# Patient Record
Sex: FEMALE | Race: WHITE | Employment: OTHER | URBAN - METROPOLITAN AREA
[De-identification: names, ages, dates, MRNs, and addresses within clinical notes are randomized per-mention and may not be internally consistent; named-entity substitution may affect disease eponyms.]

---

## 2020-07-24 ENCOUNTER — DOCUMENTATION ONLY (OUTPATIENT)
Dept: CARE COORDINATION | Facility: CLINIC | Age: 65
End: 2020-07-24

## 2020-08-20 ENCOUNTER — OFFICE VISIT (OUTPATIENT)
Dept: INTERNAL MEDICINE | Facility: CLINIC | Age: 65
End: 2020-08-20
Payer: COMMERCIAL

## 2020-08-20 VITALS
HEART RATE: 64 BPM | DIASTOLIC BLOOD PRESSURE: 82 MMHG | OXYGEN SATURATION: 98 % | SYSTOLIC BLOOD PRESSURE: 135 MMHG | WEIGHT: 127 LBS

## 2020-08-20 DIAGNOSIS — K52.9 COLITIS: ICD-10-CM

## 2020-08-20 DIAGNOSIS — Z00.00 LABORATORY EXAM ORDERED AS PART OF ROUTINE GENERAL MEDICAL EXAMINATION: ICD-10-CM

## 2020-08-20 DIAGNOSIS — Z85.3 PERSONAL HISTORY OF MALIGNANT NEOPLASM OF BREAST: ICD-10-CM

## 2020-08-20 DIAGNOSIS — D75.1 POLYCYTHEMIA: ICD-10-CM

## 2020-08-20 DIAGNOSIS — E04.1 THYROID NODULE: ICD-10-CM

## 2020-08-20 DIAGNOSIS — Z13.220 SCREENING FOR HYPERLIPIDEMIA: ICD-10-CM

## 2020-08-20 DIAGNOSIS — Z78.0 MENOPAUSE: Primary | ICD-10-CM

## 2020-08-20 RX ORDER — NEBIVOLOL 10 MG/1
10 TABLET ORAL
COMMUNITY
Start: 2019-07-24

## 2020-08-20 RX ORDER — SODIUM CHLORIDE 5 %
1 OINTMENT (GRAM) OPHTHALMIC (EYE) DAILY
COMMUNITY

## 2020-08-20 RX ORDER — LORAZEPAM 0.5 MG/1
TABLET ORAL
COMMUNITY
Start: 2019-12-02

## 2020-08-20 RX ORDER — MESALAMINE 1.2 G/1
2.4 TABLET, DELAYED RELEASE ORAL
COMMUNITY
Start: 2020-01-14

## 2020-08-20 RX ORDER — ASPIRIN 81 MG/1
TABLET ORAL
COMMUNITY
Start: 2018-10-02

## 2020-08-20 ASSESSMENT — PAIN SCALES - GENERAL: PAINLEVEL: NO PAIN (0)

## 2020-08-20 NOTE — NURSING NOTE
Chief Complaint   Patient presents with     Establish Care     pt here to establish care, has a ew questions       Jose Maria Marin CMA, EMT at 7:51 AM on 8/20/2020.

## 2020-08-20 NOTE — PATIENT INSTRUCTIONS
[facility] Medication Refill Request Information:  * Please contact your pharmacy regarding ANY request for medication refills.  ** [facility] Fax = [phone]  * Please allow 3 business days for routine medication refills.  * Please allow 5 business days for controlled substance medication refills.     [facility] Test Result notification information:  *You will be notified with in 7-10 days of your appointment day regarding the results of your test.  If you are on MyChart you will be notified as soon as the provider has reviewed the results and signed off on them.    [facility]: [phone]     Radiology for dexa scan:  [phone] [facility] [place]  [phone] [facility]  [phone] [facility]

## 2020-10-05 ENCOUNTER — VIRTUAL VISIT (OUTPATIENT)
Dept: INTERNAL MEDICINE | Facility: CLINIC | Age: 65
End: 2020-10-05
Payer: MEDICARE

## 2020-10-05 DIAGNOSIS — H81.11 BENIGN PAROXYSMAL POSITIONAL VERTIGO, RIGHT: Primary | ICD-10-CM

## 2020-10-05 PROCEDURE — 99213 OFFICE O/P EST LOW 20 MIN: CPT | Mod: 95 | Performed by: INTERNAL MEDICINE

## 2020-10-05 NOTE — PROGRESS NOTES
Pre chartin:10 a.m.- 11:30 a.m.    Total time = 20 min  Date of pre charting: 10/5/20  Date of anticipated future office visit date: 10:05 a.m.  Person performing pre charting work:  Dr. Garnica  Non face-to-face clinical work included review/documentation of medical history, medications, vital sign trends,  routine health care maintenance, specialist notes, laboratory, procedures, imaging) as detailed below.  Effort essential to preparing for upcoming service directly affecting ongoing primary care management and coordination of care for this patient for future OV date 10/5/20.    Virtual audio visit    CC:dizziness    Other health care team members  Primary Care physician:  Dr. Daily  ENT:  Dr. Truong ([facility])  Hematology/oncology:  Dr. Koch ([place])  GI:  [facility] Sch[facility]. Dr. Arreguin ([place])      HPI:  I have not met this patient before.    She established care with Dr. Daily on 20.    65 year old female with history of right breast cancer (dx'd age 38), DCIS/invasive malignancy and Pagets, simple mastectomy and chemotherapy, followed by Tamoxifen x 5 years.    Also hx indeterminate colitis, 2012    Myeloproiferative disorder, followed at [place].    Also hx bilateral tinnitus, previously followed at [facility] in 2019, had hearing test and cerumen clearing, mild high frequency S/N hearing loss.     Saw Dr. Joe 20 for f/u myeloproliferative neoplasm.  Stable blood counts x 3 years.    Today:  C/O dizzinesswhile lying down on right side.  Gets wave of feeling dizzy.  Moving to left side resolves the problem rapidly.  When tilting head back while showering, got another wave of dizziness which resolved with raising head back up.  Positional dizzy spells have been occurring nearly every night for about the last 1.5 weeks.  Prior to that, had first episode approximately , while turning over in bed. Has had a few episodes since then, all self-limiting. No problems with driving, turning head during the  day, not with standing.  No headache, change in hearing loss or chronic tinnitus, no double vision, numbness, tingling, weakness, dysarthria, imbalance, trouble with coordination, nausea, vomiting. No trauma or injury.  She wonders if her symptoms are connected to her myeloproliferative disorder.  No URI or allergy symptoms.    Discussed the typical symptoms of BPPV, potential course patterns, and mechanism.  Discussed the availability of PT for BPPV.  Also discussed signs of stroke and tumor and advised her to seek prompt care if they occur, or if vertigo.    Otherwise feeling well.  She would prefer to continue to observe her symptoms for now and will let us know if she'd like a PT referral.  Likewise, she agreed to seek prompt care for the signs mentioned above.    No other needs at this time. She is planning on getting the labs that Dr. Daily has ordered and will follow up with her on the same day.    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     Tinnitus      Past Surgical History:   Procedure Laterality Date     AS RAD RESEC TONSIL/PILLARS       MASTECTOMY Right 1994     Current Outpatient Medications   Medication Sig Dispense Refill     aspirin 81 MG EC tablet One tab daily with a meal       LORazepam (ATIVAN) 0.5 MG tablet        mesalamine (LIALDA) 1.2 g EC tablet Take 2.4 g by mouth       nebivolol (BYSTOLIC) 10 MG tablet Take 10 mg by mouth       sodium chloride (SURYA 128) 5 % ophthalmic ointment 1 Application daily       VITAMIN D PO        Allergies   Allergen Reactions     Contrast Dye Palpitations     IVP dye=rapid heart rate and flush  IVP dye=rapid heart rate and flush       Shellfish Allergy Other (See Comments)     Dizziness   Hives and faint feeling       Epinephrine Other (See Comments)     Racing heart rate      BP Readings from Last 6 Encounters:   08/20/20 135/82  "    Wt Readings from Last 5 Encounters:   08/20/20 57.6 kg (127 lb)     Gen:  Appears healthy, engaging, alert.  No signs of facial weakness, or dysarthria.  Able to hear me over video without difficulty. Moving upper limbs normally.    Trina was seen today for recheck medication.    Diagnoses and all orders for this visit:    Benign paroxysmal positional vertigo, right      See HPI for additional details.    F/U as needed.    This patient is being evaluated via a billable video visit as an alternative to an in-person visit.       The patient has been notified of following:     \"This video visit will be conducted via a call between you and your physician/provider. We have found that certain health care needs can be provided without the need for an in-person physical exam.  This service lets us provide the care you need with a video conversation.  If a prescription is necessary we can send it directly to your pharmacy.  If lab work is needed we can place an order for that and you can then stop by our lab to have the test done at a later time. If during the course of the call the physician/provider feels a video visit is not appropriate, you will not be charged for this service.\"     Patient has given verbal consent for virtual video visit? Yes  Did patient initiate this virtual visit? Yes    Person spoken to:  Patient    This was a synchronous virtual visit  Location of patient: home  Location of physician:  home office  Department name:  Medicine  Mode of communication:  Video Conference via [employer]    Time video initiated:  1:32 pm  Time video ended:  1:49 pm  Total length of video visit: 17 min    Polina Garnica M.D.  Internal Medicine  Primary Care Center       Patients: if you have questions or concerns about this progress note, please discuss them with the provider at at a future office visit.                "

## 2020-10-05 NOTE — NURSING NOTE
Chief Complaint   Patient presents with     Recheck Medication     dizziness and follow up     Kimberly Nissen, EMT at 1:20 PM on 10/5/2020    Video Visit Technology for this patient: Jay not working, patient has smart device, please try Doximity Video with patient

## 2021-01-04 ENCOUNTER — HEALTH MAINTENANCE LETTER (OUTPATIENT)
Age: 66
End: 2021-01-04

## 2021-01-29 ENCOUNTER — VIRTUAL VISIT (OUTPATIENT)
Dept: INTERNAL MEDICINE | Facility: CLINIC | Age: 66
End: 2021-01-29
Payer: MEDICARE

## 2021-01-29 ENCOUNTER — MYC MEDICAL ADVICE (OUTPATIENT)
Dept: INTERNAL MEDICINE | Facility: CLINIC | Age: 66
End: 2021-01-29

## 2021-01-29 DIAGNOSIS — R00.2 PALPITATIONS: ICD-10-CM

## 2021-01-29 DIAGNOSIS — D47.1 MYELOPROLIFERATIVE NEOPLASM (H): ICD-10-CM

## 2021-01-29 DIAGNOSIS — K52.9 COLITIS: ICD-10-CM

## 2021-01-29 DIAGNOSIS — H81.13 BENIGN PAROXYSMAL POSITIONAL VERTIGO DUE TO BILATERAL VESTIBULAR DISORDER: ICD-10-CM

## 2021-01-29 DIAGNOSIS — I10 ESSENTIAL HYPERTENSION: Primary | ICD-10-CM

## 2021-01-29 DIAGNOSIS — H93.19 TINNITUS, UNSPECIFIED LATERALITY: ICD-10-CM

## 2021-01-29 DIAGNOSIS — D45 POLYCYTHEMIA VERA (H): ICD-10-CM

## 2021-01-29 DIAGNOSIS — C50.911 MALIGNANT NEOPLASM OF RIGHT FEMALE BREAST, UNSPECIFIED ESTROGEN RECEPTOR STATUS, UNSPECIFIED SITE OF BREAST (H): ICD-10-CM

## 2021-01-29 PROCEDURE — 99214 OFFICE O/P EST MOD 30 MIN: CPT | Mod: 95 | Performed by: INTERNAL MEDICINE

## 2021-01-29 RX ORDER — LATANOPROST 50 UG/ML
1 SOLUTION/ DROPS OPHTHALMIC
COMMUNITY
Start: 2021-01-11

## 2021-01-29 NOTE — NURSING NOTE
Chief Complaint   Patient presents with     Recheck Medication     follow up     Kimberly Nissen, EMT at 8:25 AM on 1/29/2021    Video Visit Technology for this patient: Jay Video Visit- Patient was left in waiting room

## 2021-01-29 NOTE — PROGRESS NOTES
Trina Ramirez is a 65 year old female who is being evaluated via a billable video visit.      The patient has consented to a video visit and informed that video and telephone visits are being performed during the COVID-19 pandemic in order to mitigate the risk of an in office visit for appropriate candidates/issues. If during the course of the call the physician/provider feels a video visit is not appropriate, you will not be charged for this service. If the provider feels that they are unable to assess your concerns without an in person visit, you will be advised of this limitation and depending on the nature of the concern, advised to seek in person care if your provider feels you need urgent evaluation.      Subjective     Trina Ramirez is a 65 year old female who presents to clinic today for the following health issues:    Chief Complaint   Patient presents with     Recheck Medication     follow up       HPI    PMH significant for PV, remote breast cancer s/p mastectomy/chemo/hormonal therapy, colitis, HTN.    Did mammo 10/20  Has not done blood work, bone density, pcv 23    Has been taking BP, ranges 120-150 at home  She has had tachycardia and palpitations, which is why she is on nebivolol  The episodes are short lived, sometimes a couple times a week sometimes a couple a month    Echo 2016:   Final Conclusion   Normal left ventricular ejection fraction calculated at 57%.   No regional wall motion abnormalities.   The right ventricle is normal in size and function.   No significant valvular heart disease noted.      PMH  History of R breast cancer diagnosed age 37 yo, treated in 1994, DCIS/invasive malignancy and pagets, had simple mastectomy and CMF chemo at [facility], followed by 5 years of tamoxifen.    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  This is well controlled. She goes to [place] for this.  1/17:  DIAGNOSIS:    A.  Terminal Ileum, endoscopic biopsy:  Normal ileal mucosa.        B.  Colon, Right, endoscopic biopsy:  Colonic mucosa without    diagnostic abnormality.       C.  Colon, Left, endoscopic biopsy:  Colonic mucosa without    diagnostic abnormality.   She had 8 lumps removed from the left side of her neck.    Discovered myeloproliferative do, +Jak2 mutation, d/t thrombocytosis of 400-500k over several years.  Was seen at [place] for this. She did have a BM Bx in 2016.  Sister has leukemia.  She takes an ASA only.  10/16  DIAGNOSIS:    Peripheral blood, bone marrow aspirate and biopsy, iliac crest:          1)  Normocellular bone marrow with atypical megakaryocytes and    intact erythropoiesis and granulopoiesis.        2)  Positive for the JAK2 V617F mutation. See comment.     She has noted tinnitus bilat (cricket sounds) recently, 5/19. Saw ENT at [facility] and did hearing test and cerumen cleaning.  Had mild high frequency hearing loss.    2019  FINDINGS:   Spleen: Normal.  Spleen length: 9.9 cm. This is likely not significantly changed  compared to a length of approximately 10.5 cm on CT enterography 01/10/2017  accounting for differences in technique.      Routine Health Maintenence:  Immunizations (zoster, pneumovax, flu, Tdap, Hep A/B):   There is no immunization history on file for this patient.  Lipids: ordered  Lung Ca Screening (>30 pk age 55-79 or >20 py age 50-79 + RF): not indicated  Colonoscopy (50-75 yrs): 2017, last colo, due 2021  Dexa (>65W or 70M yrs): ordered  Mammogram (40-75 yrs): 1/19, scheduled this fall  Pap (21-65 yrs): last done 8/2019        INTERPRETATION/RESULT NEGATIVE FOR INTRAEPITHELIAL LESION OR MALIGNANCY (NIL) (none)    SPECIMEN ADEQUACY Satisfactory for evaluation  Endocervical component present      HIV/HCV if risk factors:  Safety/Lifestyle:  Tob/EtOH:  Depression:  Advanced Directive:      Review of external notes as documented above                   Patient Active Problem List   Diagnosis     Breast cancer (H)     Colitis     Hypertension      Polycythemia vera (H)     Myeloproliferative neoplasm (H)     Glaucoma, open angle     Benign paroxysmal positional vertigo due to bilateral vestibular disorder     Tinnitus     Past Surgical History:   Procedure Laterality Date     AS RAD RESEC TONSIL/PILLARS       MASTECTOMY Right        Social History     Tobacco Use     Smoking status: Never Smoker     Smokeless tobacco: Never Used   Substance Use Topics     Alcohol use: Not on file     Comment: rare     Family History   Problem Relation Age of Onset     Breast Cancer Mother 72        85 yo at time of death     Tuberculosis Mother      Heart Failure Mother      Colitis Mother      Colon Cancer Mother      Cerebrovascular Disease Father 63         suddenly     Lupus Sister      Leukemia Sister         AML, s/p BMT     Atrial fibrillation Sister      Psoriasis Brother      Atrial fibrillation Brother      Prostate Cancer Brother      Breast Cancer Cousin      Colitis Cousin          Current Outpatient Medications   Medication Sig Dispense Refill     aspirin 81 MG EC tablet One tab daily with a meal       latanoprost (XALATAN) 0.005 % ophthalmic solution Apply 1 drop to eye       mesalamine (LIALDA) 1.2 g EC tablet Take 2.4 g by mouth       nebivolol (BYSTOLIC) 10 MG tablet Take 10 mg by mouth       sodium chloride (SURYA 128) 5 % ophthalmic ointment 1 Application daily       VITAMIN D PO        LORazepam (ATIVAN) 0.5 MG tablet        Allergies   Allergen Reactions     Contrast Dye Palpitations     IVP dye=rapid heart rate and flush  IVP dye=rapid heart rate and flush       Shellfish Allergy Other (See Comments)     Dizziness   Hives and faint feeling       Epinephrine Other (See Comments)     Racing heart rate          Review of Systems   A detailed ROS was performed and was negative unless indicated in the HPI above.        Physical Exam   There were no vitals taken for this visit.  Wt Readings from Last 2 Encounters:   20 57.6 kg (127 lb)      Ht  Readings from Last 2 Encounters:   No data found for Ht     GENERAL: healthy, alert and no distress  HEAD: Normocephalic, atraumatic  EYES: Eyes grossly normal to inspection, EOMI and conjunctivae and sclerae normal  RESP: Speaking in full sentences, unlabored, no audible wheezes or cough  SKIN: no suspicious lesions or rashes, no jaundice  NEURO: oriented, and speech normal  PSYCH: mentation appears normal, affect normal/bright          Diagnostic Test Results:  Labs reviewed in Epic            Assessment and Plan:  Trina was seen today for recheck medication.    Diagnoses and all orders for this visit:    Essential hypertension  Patient will start self-monitoring and let me know values, could consider uptitrating meds or adding a new agent    Malignant neoplasm of right female breast, unspecified estrogen receptor status, unspecified site of breast (H)  Stable    Colitis  Stable, labs ordered    Polycythemia vera (H)  Myeloproliferative neoplasm (H)  Labs ordered for monitoring    Benign paroxysmal positional vertigo due to bilateral vestibular disorder  Previously saw Dr. Garnica, Discussed benign nature    Palpitations  -     Leadless EKG Monitor 8 to 14 Days; Future          Video-Visit Details    Type of service:  Video Visit    Video Start/End Time: 9:06 AM 9:34 AM    Originating Location (pt. Location): Home    Distant Location (provider location):  [facility]     Mode of Communication:  Video Conference via  [employer] or  [employer]        Trina Daily MD  Internal Medicine      >30 minutes spent today performing chart review, history and exam, documentation and further activities as noted above.

## 2021-03-19 ENCOUNTER — MYC MEDICAL ADVICE (OUTPATIENT)
Dept: INTERNAL MEDICINE | Facility: CLINIC | Age: 66
End: 2021-03-19

## 2021-03-23 NOTE — TELEPHONE ENCOUNTER
Vaccines have been updated in patients chart. Will send to clinic coordinators to help schedule appointments. Brie Mao LPN 3/23/2021 6:48 AM

## 2021-03-23 NOTE — TELEPHONE ENCOUNTER
Spoke to patient. Patient decline to schedule heart monitor order in January by PCP. States she will call to schedule after sister's surgery or when back in town.    Yanique Apple, Clinic Coordinator, March 23, 2021 at 8:17 AM

## 2021-08-27 ENCOUNTER — TELEPHONE (OUTPATIENT)
Dept: INTERNAL MEDICINE | Facility: CLINIC | Age: 66
End: 2021-08-27

## 2021-08-27 NOTE — TELEPHONE ENCOUNTER
[facility] Call Center    Phone Message    May a detailed message be left on voicemail: yes     Reason for Call: Symptoms or Concerns     If patient has red-flag symptoms, warm transfer to triage line    Current symptom or concern:  Trina fell and hurt her ankle last night 8/26/21.  Right Ankle.  Black and blue but swollen (about 2x as big as the other side).  She is able to put weight on it and walk.     Symptoms have been present for:  1 day(s)    Has patient previously been seen for this? No    By:  NA    Date: NA    Are there any new or worsening symptoms? Yes: New injury - Trina is asking to get an xray ordered.      Please call her back to discuss what she should do next.       Action Taken: Message routed to:  [facility] ([facility] PCP    Travel Screening: Not Applicable

## 2021-08-27 NOTE — TELEPHONE ENCOUNTER
I called, no answer and VM is full. Patient must be seen in urgent care or can also offer same day ortho access clinic .  Margo Colvin, EMT at 2:22 PM on 8/27/2021.  [facility]  [facility]  [place]  [phone]

## 2021-10-11 ENCOUNTER — HEALTH MAINTENANCE LETTER (OUTPATIENT)
Age: 66
End: 2021-10-11

## 2022-01-30 ENCOUNTER — HEALTH MAINTENANCE LETTER (OUTPATIENT)
Age: 67
End: 2022-01-30

## 2022-09-24 ENCOUNTER — HEALTH MAINTENANCE LETTER (OUTPATIENT)
Age: 67
End: 2022-09-24

## 2023-01-29 ENCOUNTER — HEALTH MAINTENANCE LETTER (OUTPATIENT)
Age: 68
End: 2023-01-29